# Patient Record
Sex: MALE | Race: WHITE | NOT HISPANIC OR LATINO | Employment: UNEMPLOYED | URBAN - METROPOLITAN AREA
[De-identification: names, ages, dates, MRNs, and addresses within clinical notes are randomized per-mention and may not be internally consistent; named-entity substitution may affect disease eponyms.]

---
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
## 2023-06-08 ENCOUNTER — OFFICE VISIT (OUTPATIENT)
Dept: FAMILY MEDICINE CLINIC | Facility: MEDICAL CENTER | Age: 18
End: 2023-06-08
Payer: COMMERCIAL

## 2023-06-08 VITALS
OXYGEN SATURATION: 98 % | HEART RATE: 65 BPM | HEIGHT: 69 IN | DIASTOLIC BLOOD PRESSURE: 64 MMHG | SYSTOLIC BLOOD PRESSURE: 114 MMHG | WEIGHT: 182 LBS | RESPIRATION RATE: 16 BRPM | TEMPERATURE: 98.1 F | BODY MASS INDEX: 26.96 KG/M2

## 2023-06-08 DIAGNOSIS — J34.2 NOSE SEPTUM DEVIATION: Primary | ICD-10-CM

## 2023-06-08 PROCEDURE — 99213 OFFICE O/P EST LOW 20 MIN: CPT | Performed by: FAMILY MEDICINE

## 2023-06-08 NOTE — PROGRESS NOTES
"Shelton Patton has a chief complaint of not being able to breathe through his left nostril  For about 3 months or so  It has been coming on slowly  No history of trauma to his nose  He has a deviation that I can appreciate externally  On exam of the mucosa on the left its very erythematous and friable, on the right it is normal   Also there is a deviation of the septum to the left and on the corresponding nostril you can see an indentation that correspond with the friable deviated septal mucosa  I am going to refer him to ENT  I am not really sure what is going on here  Dr Mandy Domínguez was very kind to get him in as soon as possible  Otherwise his history is negative    Review of systems are negative    /64 (BP Location: Left arm, Patient Position: Sitting, Cuff Size: Adult)   Pulse 65   Temp 98 1 °F (36 7 °C)   Resp 16   Ht 5' 8 5\" (1 74 m)   Wt 82 6 kg (182 lb)   SpO2 98%   BMI 27 27 kg/m²     See above for the exam   "

## 2023-09-11 ENCOUNTER — TELEPHONE (OUTPATIENT)
Dept: FAMILY MEDICINE CLINIC | Facility: MEDICAL CENTER | Age: 18
End: 2023-09-11

## 2023-09-11 NOTE — TELEPHONE ENCOUNTER
Pt's mother called. She wondered if Dr Florentin Robertson would be able to recommend a doctor in the [facility] area for pt and his brothers.     Routed to Dr Florentin Robertson - please advise

## 2023-09-29 ENCOUNTER — APPOINTMENT (OUTPATIENT)
Dept: RADIOLOGY | Facility: MEDICAL CENTER | Age: 18
End: 2023-09-29
Payer: COMMERCIAL

## 2023-09-29 ENCOUNTER — OFFICE VISIT (OUTPATIENT)
Dept: URGENT CARE | Facility: MEDICAL CENTER | Age: 18
End: 2023-09-29
Payer: COMMERCIAL

## 2023-09-29 VITALS
HEART RATE: 62 BPM | TEMPERATURE: 98 F | OXYGEN SATURATION: 97 % | DIASTOLIC BLOOD PRESSURE: 75 MMHG | HEIGHT: 68 IN | BODY MASS INDEX: 26.98 KG/M2 | RESPIRATION RATE: 18 BRPM | WEIGHT: 178 LBS | SYSTOLIC BLOOD PRESSURE: 135 MMHG

## 2023-09-29 DIAGNOSIS — S93.401A SPRAIN OF RIGHT ANKLE, UNSPECIFIED LIGAMENT, INITIAL ENCOUNTER: ICD-10-CM

## 2023-09-29 DIAGNOSIS — S93.401A SPRAIN OF RIGHT ANKLE, UNSPECIFIED LIGAMENT, INITIAL ENCOUNTER: Primary | ICD-10-CM

## 2023-09-29 PROBLEM — Z00.00 ENCOUNTER FOR ANNUAL PHYSICAL EXAM: Status: ACTIVE | Noted: 2023-09-29

## 2023-09-29 PROCEDURE — G0382 LEV 3 HOSP TYPE B ED VISIT: HCPCS

## 2023-09-29 PROCEDURE — 73610 X-RAY EXAM OF ANKLE: CPT

## 2023-09-29 PROCEDURE — 73630 X-RAY EXAM OF FOOT: CPT

## 2023-09-29 NOTE — PROGRESS NOTES
[facility]        NAME: Kenroy Doran is a 21 y.o. male  : 2005    MRN: [id number]  DATE: 2023  TIME: 5:29 PM    Assessment and Plan   Sprain of right ankle, unspecified ligament, initial encounter [S93.401A]  1. Sprain of right ankle, unspecified ligament, initial encounter  XR foot 3+ vw right    XR ankle 3+ vw right        XR foot 3+ vw right: FINDINGS: There is no acute fracture or dislocation. No significant degenerative changes. No lytic or blastic osseous lesion. Soft tissues are unremarkable. IMPRESSION: No acute osseous abnormality. XR ankle 3+ vw right:  FINDINGS: There is no acute fracture or dislocation. No significant degenerative changes. No lytic or blastic osseous lesion. Soft tissues are unremarkable. IMPRESSION: No acute osseous abnormality. ACE wrap provided  Patient Instructions   Tylenol/Ibuprofen for pain  Wear ACE wrap for support     Ice 20 minutes 3-4 times per day for 3 days  Insulate the skin from the ice to prevent frostbite  Rest  Elevate    Follow up with orthopedic if symptoms do not improve  Follow up with PCP in 3-5 days. Proceed to ER if symptoms worsen. Chief Complaint     Chief Complaint   Patient presents with   • Ankle Pain     Pt. With right foot and ankle pain that began two weeks ago. He may have twisted it in rugby, and he states he also injured it last February. History of Present Illness       Ankle Pain   The incident occurred more than 1 week ago. [place] park ([place], rolled his ankle). The injury mechanism was an eversion injury. The pain is present in the right ankle. Associated symptoms include an inability to bear weight. Pertinent negatives include no loss of motion, loss of sensation, numbness or tingling. He reports no foreign bodies present. The symptoms are aggravated by weight bearing and movement.        Review of Systems   Review of Systems   Constitutional: Negative for chills, diaphoresis, fatigue and fever. Respiratory: Negative. Negative for cough. Cardiovascular: Negative. Musculoskeletal: Positive for gait problem and myalgias. Skin: Positive for color change. Negative for rash and wound. Neurological: Negative for tingling and numbness. Current Medications       Current Outpatient Medications:   •  naproxen (Naprosyn) 500 mg tablet, Take 1 tablet (500 mg total) by mouth 2 (two) times a day with meals for 14 days, Disp: 28 tablet, Rfl: 0    Current Allergies     Allergies as of 09/29/2023   • (No Known Allergies)            The following portions of the patient's history were reviewed and updated as appropriate: allergies, current medications, past family history, past medical history, past social history, past surgical history and problem list.     No past medical history on file. No past surgical history on file. No family history on file. Medications have been verified. Objective   /75   Pulse 62   Temp 98 °F (36.7 °C)   Resp 18   Ht 5' 8" (1.727 m)   Wt 80.7 kg (178 lb)   SpO2 97%   BMI 27.06 kg/m²        Physical Exam     Physical Exam  Vitals and nursing note reviewed. Constitutional:       Appearance: Normal appearance. HENT:      Head: Normocephalic and atraumatic. Cardiovascular:      Rate and Rhythm: Normal rate and regular rhythm. Pulses: Normal pulses. Heart sounds: Normal heart sounds. No murmur heard. Pulmonary:      Effort: Pulmonary effort is normal. No respiratory distress. Breath sounds: Normal breath sounds. No stridor. No wheezing, rhonchi or rales. Chest:      Chest wall: No tenderness. Musculoskeletal:         General: Swelling, tenderness and signs of injury present. No deformity. Right ankle: Swelling and ecchymosis present. Tenderness present over the lateral malleolus. Normal range of motion. Skin:     General: Skin is warm. Findings: Bruising present. No erythema or rash. Neurological:      Mental Status: He is alert.

## 2023-09-29 NOTE — PATIENT INSTRUCTIONS
Tylenol/Ibuprofen for pain  Wear ACE wrap for support     Ice 20 minutes 3-4 times per day for 3 days  Insulate the skin from the ice to prevent frostbite  Rest  Elevate    Follow up with orthopedic if symptoms do not improve  Follow up with PCP in 3-5 days. Proceed to ER if symptoms worsen.